# Patient Record
Sex: MALE | Race: WHITE | URBAN - METROPOLITAN AREA
[De-identification: names, ages, dates, MRNs, and addresses within clinical notes are randomized per-mention and may not be internally consistent; named-entity substitution may affect disease eponyms.]

---
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
## 2020-02-20 ENCOUNTER — OFFICE VISIT (OUTPATIENT)
Dept: FAMILY MEDICINE CLINIC | Age: 20
End: 2020-02-20

## 2020-02-20 VITALS
DIASTOLIC BLOOD PRESSURE: 48 MMHG | WEIGHT: 150 LBS | RESPIRATION RATE: 16 BRPM | TEMPERATURE: 98.2 F | BODY MASS INDEX: 18.65 KG/M2 | HEART RATE: 80 BPM | SYSTOLIC BLOOD PRESSURE: 99 MMHG | OXYGEN SATURATION: 98 % | HEIGHT: 75 IN

## 2020-02-20 DIAGNOSIS — F41.9 ANXIETY: Primary | ICD-10-CM

## 2020-02-20 DIAGNOSIS — F90.9 ATTENTION DEFICIT HYPERACTIVITY DISORDER (ADHD), UNSPECIFIED ADHD TYPE: ICD-10-CM

## 2020-02-20 DIAGNOSIS — E55.9 VITAMIN D DEFICIENCY: ICD-10-CM

## 2020-02-20 RX ORDER — ESCITALOPRAM OXALATE 10 MG/1
10 TABLET ORAL DAILY
Qty: 30 TAB | Refills: 3 | Status: SHIPPED | OUTPATIENT
Start: 2020-02-20 | End: 2020-03-19 | Stop reason: SDUPTHER

## 2020-02-20 NOTE — PROGRESS NOTES
Karyn Joyner is a 23 y.o. male who presents today with the following:    HPI  Chief Complaint   Patient presents with    Anxiety     Not depressed      Seeing an online Therapist (counselor)  for anxiety            1. Anxiety  Karyn Joyner is a 23 y.o. male who presents for new evaluation and treatment of of anxiety    ESTABLISHED ANXIETY DIAGNOSIS?:  Patient has always felt generalized anxiety but recently 1 month ago started getting worse. He has never had any treatment for it in the past.  PROMINENT SYMPTOMS: shortness of breath, racing thoughts, feelings of losing control, difficulty concentrating. TRUE PANIC ATTACKS?: occur in addition to generalized anxiety   FREQUENCY OF ANXIETY:  3 times a week recently getting worse  TRIGGERS: There are no apparent triggers that increase the anxiety. CURRENT TREATMENT: No pharmacotherapy. Currently seeking online psychotherapy  Reported side effects from the medication(s) above include: none. PERTINENT HISTORY: negative for bipolar disorder, depression, hyperthyroidism, obsessive- compulsive disorder, post traumatic stress disorder, drug use and alcohol use   FAMILY HISTORY: Positive for generalized anxiety disorders and depression. Patient denies suicidal or homicidal ideation. Current symptoms include trouble relaxing, worrying, not being able to stop or control worrying, worrying too much about different things, being easily annoyed or irritable. Sleep 7 to 8 hours daily. Has increased caffeine intake. Review of Systems   Constitutional: Negative. HENT: Negative. Eyes: Negative. Respiratory: Negative. Cardiovascular: Negative. Gastrointestinal: Negative. Genitourinary: Negative. Musculoskeletal: Negative. Skin: Negative. Neurological: Negative. Endo/Heme/Allergies: Negative. Psychiatric/Behavioral: Negative for depression and suicidal ideas. The patient is nervous/anxious. The patient does not have insomnia. Physical Exam  Vitals signs and nursing note reviewed. HENT:      Head: Normocephalic and atraumatic. Right Ear: External ear normal.      Left Ear: External ear normal.      Nose: Nose normal.      Mouth/Throat:      Pharynx: No oropharyngeal exudate. Eyes:      Conjunctiva/sclera: Conjunctivae normal.   Neck:      Musculoskeletal: Normal range of motion and neck supple. Thyroid: No thyromegaly. Cardiovascular:      Rate and Rhythm: Normal rate and regular rhythm. Pulmonary:      Effort: Pulmonary effort is normal.      Breath sounds: Normal breath sounds. Abdominal:      General: Bowel sounds are normal. There is no distension. Palpations: Abdomen is soft. Tenderness: There is no abdominal tenderness. Musculoskeletal: Normal range of motion. Lymphadenopathy:      Cervical: No cervical adenopathy. Skin:     General: Skin is warm and dry. Neurological:      Mental Status: He is alert and oriented to person, place, and time. Psychiatric:         Mood and Affect: Mood and affect normal.       BP 99/48   Pulse 80   Temp 98.2 °F (36.8 °C) (Oral)   Resp 16   Ht 6' 2.5\" (1.892 m)   Wt 150 lb (68 kg)   SpO2 98%   BMI 19.00 kg/m²     No Known Allergies    Current Outpatient Medications   Medication Sig    escitalopram oxalate (LEXAPRO) 10 mg tablet Take 1 Tab by mouth daily. Indications: repeated episodes of anxiety     No current facility-administered medications for this visit.         Past Medical History:   Diagnosis Date    ADHD (attention deficit hyperactivity disorder)     Anxiety     Foot drop        Past Surgical History:   Procedure Laterality Date    HX WISDOM TEETH EXTRACTION         Problem List  Date Reviewed: 2/20/2020          Codes Class Noted    Anxiety ICD-10-CM: F41.9  ICD-9-CM: 300.00  2/20/2020        ADHD (attention deficit hyperactivity disorder) ICD-10-CM: F90.9  ICD-9-CM: 314.01  3/4/2019               No results found for this visit on 02/20/20.      1. Anxiety    - URINALYSIS W/ RFLX MICROSCOPIC; Future  - VITAMIN D, 25 HYDROXY; Future  - CBC WITH AUTOMATED DIFF; Future  - METABOLIC PANEL, COMPREHENSIVE; Future  - THYROID CASCADE PROFILE; Future  - escitalopram oxalate (LEXAPRO) 10 mg tablet; Take 1 Tab by mouth daily. Indications: repeated episodes of anxiety  Dispense: 30 Tab; Refill: 3    2. Vitamin D deficiency    - VITAMIN D, 25 HYDROXY; Future    3. Attention deficit hyperactivity disorder (ADHD), unspecified ADHD type    - URINALYSIS W/ RFLX MICROSCOPIC; Future  - VITAMIN D, 25 HYDROXY; Future  - CBC WITH AUTOMATED DIFF; Future  - METABOLIC PANEL, COMPREHENSIVE; Future  - THYROID CASCADE PROFILE; Future  - REFERRAL TO PSYCHOLOGY        Follow-up and Dispositions    · Return in about 1 month (around 3/20/2020) for follow up. I have discussed the diagnosis with the patient and the intended plan as seen in the above orders. The patient has received an after-visit summary and questions were answered concerning future plans. I have discussed medication side effects and warnings with the patient as well. The patient agrees and understands above plan.            Marti Moreira MD

## 2020-02-20 NOTE — PATIENT INSTRUCTIONS
Generalized Anxiety Disorder in Teens: Care Instructions  Your Care Instructions    We all worry. It's a normal part of life. But when you have generalized anxiety disorder, you worry about lots of things. You have a hard time not worrying. This worry or anxiety interferes with your relationships, school, and life. You may worry most days about things like school, work, or friends. That may make you feel tired, tense, or cranky. It can make it hard to think. It may get in the way of healthy sleep. Counseling and medicine can both work to treat anxiety. They are often used together with lifestyle changes, such as getting enough sleep. Treatment can include a type of counseling called cognitive-behavioral therapy, or CBT. It helps you notice and replace thoughts that make you worry. You also might have counseling with your parents or guardian so that they can help you. Follow-up care is a key part of your treatment and safety. Be sure to make and go to all appointments, and call your doctor if you are having problems. It's also a good idea to know your test results and keep a list of the medicines you take. How can you care for yourself at home? · Get plenty of exercise every day. Go for a walk or jog. Ride your bike. Play sports with friends. · Learn relaxation techniques, such as deep breathing. · Go to bed at the same time every night. Try for 8 to 10 hours of sleep a night. · Avoid alcohol and illegal drugs. · Find a counselor who uses CBT. · Don't isolate yourself. Let your family and friends help you. Find someone you can trust and confide in. Talk to that person. · Be safe with medicines. Take your medicines exactly as prescribed. Call your doctor if you think you are having a problem with your medicine. When should you call for help? Call 911 anytime you think you may need emergency care. For example, call if:    · You feel you can't stop from hurting yourself or someone else.  Keep the numbers for these national suicide hotlines: 8-031-024-TALK (7-421.664.1354) and 0-365-DREBTRR (1-938.954.7053). If you or someone you know talks about suicide or feeling hopeless, get help right away.    Call your doctor now or seek immediate medical care if:    · You have new anxiety, or your anxiety gets worse.     · You have been feeling sad, depressed, or hopeless or have lost interest in things that you usually enjoy.     · You do not get better as expected. Where can you learn more? Go to http://sandoval-onel.info/. Enter G105 in the search box to learn more about \"Generalized Anxiety Disorder in Teens: Care Instructions. \"  Current as of: May 28, 2019  Content Version: 12.2  © 8648-6212 [employer]. Care instructions adapted under license by [employer] (which disclaims liability or warranty for this information). If you have questions about a medical condition or this instruction, always ask your healthcare professional. [address] any warranty or liability for your use of this information.

## 2020-02-20 NOTE — PROGRESS NOTES
Patient stated name &     Chief Complaint   Patient presents with    Anxiety     Not depressed      Seeing an online Therapist (counselor)  for anxiety             Health Maintenance Due   Topic    DTaP/Tdap/Td series (1 - Tdap)    HPV Age 9Y-34Y (1 - Male 2-dose series)    Influenza Age 5 to Adult        Wt Readings from Last 3 Encounters:   20 150 lb (68 kg) (42 %, Z= -0.20)*   19 150 lb (68 kg) (48 %, Z= -0.05)*     * Growth percentiles are based on CDC (Boys, 2-20 Years) data. Temp Readings from Last 3 Encounters:   20 98.2 °F (36.8 °C) (Oral)   19 98.2 °F (36.8 °C) (Oral)     BP Readings from Last 3 Encounters:   20 99/48   19 125/72     Pulse Readings from Last 3 Encounters:   20 80   19 78         Learning Assessment:  :     No flowsheet data found. Depression Screening:  :     3 most recent PHQ Screens 2020   Little interest or pleasure in doing things Not at all   Feeling down, depressed, irritable, or hopeless Not at all   Total Score PHQ 2 0       Fall Risk Assessment:  :     No flowsheet data found. Abuse Screening:  :     No flowsheet data found. Coordination of Care Questionnaire:  :     1) Have you been to an emergency room, urgent care clinic since your last visit? No    Hospitalized since your last visit? No             2) Have you seen or consulted any other health care providers outside of [address] [place] [address] since your last visit? No  (Include any pap smears or colon screenings in this section.)  No    Patient is accompanied by self I have received verbal consent from Nikita Chino to discuss any/all medical information while they are present in the room.

## 2020-03-19 ENCOUNTER — OFFICE VISIT (OUTPATIENT)
Dept: FAMILY MEDICINE CLINIC | Age: 20
End: 2020-03-19

## 2020-03-19 VITALS
BODY MASS INDEX: 18.15 KG/M2 | RESPIRATION RATE: 16 BRPM | DIASTOLIC BLOOD PRESSURE: 64 MMHG | TEMPERATURE: 97.8 F | SYSTOLIC BLOOD PRESSURE: 101 MMHG | OXYGEN SATURATION: 97 % | HEART RATE: 108 BPM | WEIGHT: 146 LBS | HEIGHT: 75 IN

## 2020-03-19 DIAGNOSIS — F41.9 ANXIETY: ICD-10-CM

## 2020-03-19 RX ORDER — ESCITALOPRAM OXALATE 20 MG/1
20 TABLET ORAL DAILY
Qty: 30 TAB | Refills: 5 | Status: SHIPPED | OUTPATIENT
Start: 2020-03-19 | End: 2020-10-05

## 2020-03-19 NOTE — PROGRESS NOTES
Sobeida Costello is a 23 y.o. male who presents today with the following:    HPI  Chief Complaint   Patient presents with    Labs       1. Anxiety    Sobeida Costello is a 23 y.o. male who presents for follow up of of anxiety    ESTABLISHED ANXIETY DIAGNOSIS?: yes   PROMINENT SYMPTOMS: difficulty concentrating. TRUE PANIC ATTACKS?: occur in addition to generalized anxiety   FREQUENCY OF ANXIETY:  occasionally   TRIGGERS: There are no apparent triggers that increase the anxiety. CURRENT TREATMENT: lexapro 10 mg  Reported side effects from the medication(s) above include: none. PERTINENT HISTORY: negative for bipolar disorder, depression, hyperthyroidism, obsessive- compulsive disorder, post traumatic stress disorder, drug use and alcohol use   FAMILY HISTORY:negative for psychiatric disorders. Patient denies suicidal or homicidal ideation. Wants to increase dose today for lexapro. Review of Systems   Constitutional: Negative. HENT: Negative. Eyes: Negative. Respiratory: Negative. Cardiovascular: Negative. Gastrointestinal: Negative. Genitourinary: Negative. Musculoskeletal: Negative. Skin: Negative. Neurological: Negative. Endo/Heme/Allergies: Negative. Psychiatric/Behavioral: The patient is nervous/anxious. Physical Exam  Vitals signs and nursing note reviewed. HENT:      Head: Normocephalic and atraumatic. Right Ear: External ear normal.      Left Ear: External ear normal.      Nose: Nose normal.      Mouth/Throat:      Pharynx: No oropharyngeal exudate. Eyes:      Conjunctiva/sclera: Conjunctivae normal.   Neck:      Musculoskeletal: Normal range of motion and neck supple. Thyroid: No thyromegaly. Cardiovascular:      Rate and Rhythm: Regular rhythm. Tachycardia present. Pulmonary:      Effort: Pulmonary effort is normal.      Breath sounds: Normal breath sounds.    Abdominal:      General: Bowel sounds are normal. There is no distension. Palpations: Abdomen is soft. Tenderness: There is no abdominal tenderness. Musculoskeletal: Normal range of motion. Lymphadenopathy:      Cervical: No cervical adenopathy. Skin:     General: Skin is warm and dry. Neurological:      Mental Status: He is alert and oriented to person, place, and time. Psychiatric:         Mood and Affect: Mood and affect normal.       /64   Pulse (!) 108   Temp 97.8 °F (36.6 °C) (Oral)   Resp 16   Ht 6' 2.5\" (1.892 m)   Wt 146 lb (66.2 kg)   SpO2 97%   BMI 18.49 kg/m²     No Known Allergies    Current Outpatient Medications   Medication Sig    escitalopram oxalate (LEXAPRO) 20 mg tablet Take 1 Tab by mouth daily. Indications: repeated episodes of anxiety     No current facility-administered medications for this visit. Past Medical History:   Diagnosis Date    ADHD (attention deficit hyperactivity disorder)     Anxiety     Foot drop        Past Surgical History:   Procedure Laterality Date    HX WISDOM TEETH EXTRACTION         Problem List  Date Reviewed: 3/19/2020          Codes Class Noted    Anxiety ICD-10-CM: F41.9  ICD-9-CM: 300.00  2/20/2020        ADHD (attention deficit hyperactivity disorder) ICD-10-CM: F90.9  ICD-9-CM: 314.01  3/4/2019               No results found for this visit on 03/19/20.      1. Anxiety  Increase lexapro to 20 mg daily. - escitalopram oxalate (LEXAPRO) 20 mg tablet; Take 1 Tab by mouth daily. Indications: repeated episodes of anxiety  Dispense: 30 Tab; Refill: 5        Follow-up and Dispositions    · Return in about 6 months (around 9/19/2020) for follow up. I have discussed the diagnosis with the patient and the intended plan as seen in the above orders. The patient has received an after-visit summary and questions were answered concerning future plans. I have discussed medication side effects and warnings with the patient as well. The patient agrees and understands above plan. Diaz Osorio MD

## 2020-07-02 ENCOUNTER — TELEPHONE (OUTPATIENT)
Dept: FAMILY MEDICINE CLINIC | Age: 20
End: 2020-07-02

## 2020-07-02 NOTE — TELEPHONE ENCOUNTER
Spoke with pt about his ADHD medication. He had his records from several years ago and wanted to be put back on medication. Spoke with Dr Digna Soto and she said she has referred him out to psych and she will not be prescribing new controlled substances.     Tried to call pt back and inform but no answer and VM box was not set up

## 2020-07-21 ENCOUNTER — TELEPHONE (OUTPATIENT)
Dept: FAMILY MEDICINE CLINIC | Age: 20
End: 2020-07-21

## 2020-07-21 NOTE — TELEPHONE ENCOUNTER
----- Message from Yisel Hamm sent at 7/20/2020  5:04 PM EDT -----  Regarding: DR Dyllan Watkins /  Bryan Moreno Message/Vendor Calls    Pt is requesting to speak with nurse in regard to obtaining a copy of immunization record.          Callback required    Best contact number(s):    [phone]              Yisel Hamm

## 2020-10-04 DIAGNOSIS — F41.9 ANXIETY: ICD-10-CM

## 2020-10-05 RX ORDER — ESCITALOPRAM OXALATE 20 MG/1
TABLET ORAL
Qty: 30 TAB | Refills: 4 | Status: SHIPPED | OUTPATIENT
Start: 2020-10-05 | End: 2021-03-29

## 2021-03-28 DIAGNOSIS — F41.9 ANXIETY: ICD-10-CM

## 2021-03-29 RX ORDER — ESCITALOPRAM OXALATE 20 MG/1
TABLET ORAL
Qty: 30 TAB | Refills: 3 | Status: SHIPPED | OUTPATIENT
Start: 2021-03-29 | End: 2021-09-03 | Stop reason: SDUPTHER

## 2021-03-29 NOTE — PROGRESS NOTES
Body Location Override (Optional): Left Lateral Periorbital Identified pt with two pt identifiers(name and ). Reviewed record in preparation for visit and have obtained necessary documentation. Chief Complaint   Patient presents with   [facility] Due   Topic    DTaP/Tdap/Td series (1 - Tdap)    HPV Age 9Y-34Y (3 - Male 2-dose series)    Influenza Age 5 to Adult        Coordination of Care Questionnaire:  :   1) Have you been to an emergency room, urgent care, or hospitalized since your last visit? If yes, where when, and reason for visit? no      2. Have seen or consulted any other health care provider since your last visit? no  If yes, where when, and reason for visit? Patient is accompanied by self I have received verbal consent from Sobeida Costello to discuss any/all medical information while they are present in the room. Which Doctor Performed Mohs? (Optional): Dr. Max Chatman

## 2021-09-03 ENCOUNTER — VIRTUAL VISIT (OUTPATIENT)
Dept: FAMILY MEDICINE CLINIC | Age: 21
End: 2021-09-03

## 2021-09-03 DIAGNOSIS — F41.9 ANXIETY: Primary | ICD-10-CM

## 2021-09-03 PROCEDURE — 99213 OFFICE O/P EST LOW 20 MIN: CPT | Performed by: NURSE PRACTITIONER

## 2021-09-03 RX ORDER — ESCITALOPRAM OXALATE 20 MG/1
TABLET ORAL
Qty: 30 TABLET | Refills: 3 | Status: SHIPPED | OUTPATIENT
Start: 2021-09-03 | End: 2022-01-11 | Stop reason: SDUPTHER

## 2021-09-03 NOTE — PROGRESS NOTES
Jose Alejandro Yip is a 24 y.o. male who was seen by synchronous (real-time) audio-video technology on 9/3/2021 for Anxiety and Medication Refill        Assessment & Plan:   Diagnoses and all orders for this visit:    1. Anxiety  -     escitalopram oxalate (LEXAPRO) 20 mg tablet; TAKE ONE TABLET BY MOUTH DAILY FOR REPEATED EPISODES OF ANXIETY  Indications: anxiousness associated with depression    Patient was seen by virtual video. Patient was not in need of medication refill. Patient needed his Lexapro refilled for anxiety. He reports he has been out of medication for 3 to 4 days now. Patient reports that this dose was doing well for him and did not feel any need for an increase. Later in the visit he stated that he wanted to go up in the dose because he had been on it for a good amount of time. I declined this based on his previous statement of he was doing well with that and feeling good. I explained it later in if he had breakthrough symptoms or felt that it was not working then we could consider increase but an increase was not appropriate because he had been on it for a period of time. Asked patient to schedule an in person well visit physical patient agrees and will follow-up as needed. I spent at least 20 minutes on this visit with this established patient. Subjective:       Prior to Admission medications    Medication Sig Start Date End Date Taking?  Authorizing Provider   escitalopram oxalate (LEXAPRO) 20 mg tablet TAKE ONE TABLET BY MOUTH DAILY FOR REPEATED EPISODES OF ANXIETY  Indications: anxiousness associated with depression 9/3/21  Yes Jessee Smith NP   escitalopram oxalate (LEXAPRO) 20 mg tablet TAKE ONE TABLET BY MOUTH DAILY FOR REPEATED EPISODES OF ANXIETY 3/29/21 9/3/21  Stepan Holder MD     Patient Active Problem List   Diagnosis Code    ADHD (attention deficit hyperactivity disorder) F90.9    Anxiety F41.9     Patient Active Problem List    Diagnosis Date Noted    Anxiety 02/20/2020    ADHD (attention deficit hyperactivity disorder) 03/04/2019     Current Outpatient Medications   Medication Sig Dispense Refill    escitalopram oxalate (LEXAPRO) 20 mg tablet TAKE ONE TABLET BY MOUTH DAILY FOR REPEATED EPISODES OF ANXIETY  Indications: anxiousness associated with depression 30 Tablet 3     No Known Allergies  Past Medical History:   Diagnosis Date    ADHD (attention deficit hyperactivity disorder)     Anxiety     Foot drop        Review of Systems   Constitutional: Negative for fever and malaise/fatigue. HENT: Negative for congestion, sinus pain and sore throat. Respiratory: Negative for cough and shortness of breath. Cardiovascular: Negative for chest pain. Gastrointestinal: Negative for diarrhea, nausea and vomiting. Genitourinary: Negative for dysuria. Musculoskeletal: Negative. Skin: Negative. Neurological: Negative for dizziness and headaches. Endo/Heme/Allergies: Negative for environmental allergies. Psychiatric/Behavioral: The patient is nervous/anxious. Objective:   No flowsheet data found.      [INSTRUCTIONS:  \"[x]\" Indicates a positive item  \"[]\" Indicates a negative item  -- DELETE ALL ITEMS NOT EXAMINED]    Constitutional: [x] Appears well-developed and well-nourished [x] No apparent distress      [] Abnormal -     Mental status: [x] Alert and awake  [x] Oriented to person/place/time [x] Able to follow commands    [] Abnormal -     Eyes:   EOM    [x]  Normal    [] Abnormal -   Sclera  [x]  Normal    [] Abnormal -          Discharge [x]  None visible   [] Abnormal -     HENT: [x] Normocephalic, atraumatic  [] Abnormal -   [x] Mouth/Throat: Mucous membranes are moist    External Ears [x] Normal  [] Abnormal -    Neck: [x] No visualized mass [] Abnormal -     Pulmonary/Chest: [x] Respiratory effort normal   [x] No visualized signs of difficulty breathing or respiratory distress        [] Abnormal -      Musculoskeletal:   [x] Normal gait with no signs of ataxia         [x] Normal range of motion of neck        [] Abnormal -     Neurological:        [x] No Facial Asymmetry (Cranial nerve 7 motor function) (limited exam due to video visit)          [x] No gaze palsy        [] Abnormal -          Skin:        [x] No significant exanthematous lesions or discoloration noted on facial skin         [] Abnormal -            Psychiatric:       [x] Normal Affect [] Abnormal -        [x] No Hallucinations    Other pertinent observable physical exam findings:-        We discussed the expected course, resolution and complications of the diagnosis(es) in detail. Medication risks, benefits, costs, interactions, and alternatives were discussed as indicated. I advised him to contact the office if his condition worsens, changes or fails to improve as anticipated. He expressed understanding with the diagnosis(es) and plan. Collin Rivera, was evaluated through a synchronous (real-time) audio-video encounter. The patient (or guardian if applicable) is aware that this is a billable service. Verbal consent to proceed has been obtained within the past 12 months. The visit was conducted pursuant to the emergency declaration under the [address] [place] [address] authority and the [employer] General Act. Patient identification was verified, and a caregiver was present when appropriate. The patient was located in a state where the provider was credentialed to provide care.       Spenser Jeter NP

## 2022-01-11 DIAGNOSIS — F41.9 ANXIETY: ICD-10-CM

## 2022-01-11 NOTE — TELEPHONE ENCOUNTER
PCP: Deidra Murry MD    Last appt: 9/3/2021  No future appointments.     Requested Prescriptions     Pending Prescriptions Disp Refills    escitalopram oxalate (LEXAPRO) 20 mg tablet 30 Tablet 3     Sig: TAKE ONE TABLET BY MOUTH DAILY FOR REPEATED EPISODES OF ANXIETY  Indications: anxiousness associated with depression       Prior labs and Blood pressures:  BP Readings from Last 3 Encounters:   03/19/20 101/64   02/20/20 99/48   03/04/19 125/72     No results found for: NA, K, CL, CO2, AGAP, GLU, BUN, CREA, BUCR, GFRAA, GFRNA, CA, GFRAA  No results found for: HBA1C, KRM2POGM, PEW9CWEG  No results found for: CHOL, CHOLPOCT, CHOLX, CHLST, CHOLV, HDL, HDLPOC, HDLP, LDL, LDLCPOC, LDLC, DLDLP, VLDLC, VLDL, TGLX, TRIGL, TRIGP, TGLPOCT, CHHD, CHHDX  No results found for: VITD3, XQVID2, XQVID3, XQVID, VD3RIA    No results found for: TSH, TSH2, TSH3, TSHP, TSHEXT

## 2022-01-12 RX ORDER — ESCITALOPRAM OXALATE 20 MG/1
TABLET ORAL
Qty: 30 TABLET | Refills: 3 | Status: SHIPPED | OUTPATIENT
Start: 2022-01-12

## 2022-03-18 PROBLEM — F41.9 ANXIETY: Status: ACTIVE | Noted: 2020-02-20

## 2022-03-19 PROBLEM — F90.9 ADHD (ATTENTION DEFICIT HYPERACTIVITY DISORDER): Status: ACTIVE | Noted: 2019-03-04

## 2022-05-26 ENCOUNTER — TELEPHONE (OUTPATIENT)
Dept: FAMILY MEDICINE CLINIC | Age: 22
End: 2022-05-26

## 2022-05-26 NOTE — TELEPHONE ENCOUNTER
Pt called back saying that he has been going to [facility] and will refill rx through them. Advised pt to call pharmacy for emergency refill.     --Bon

## 2023-05-25 RX ORDER — ESCITALOPRAM OXALATE 20 MG/1
TABLET ORAL
COMMUNITY
Start: 2022-01-12